# Patient Record
Sex: FEMALE | Race: WHITE | Employment: OTHER | URBAN - METROPOLITAN AREA
[De-identification: names, ages, dates, MRNs, and addresses within clinical notes are randomized per-mention and may not be internally consistent; named-entity substitution may affect disease eponyms.]

---

## 2018-08-06 ENCOUNTER — HOSPITAL ENCOUNTER (EMERGENCY)
Age: 78
Discharge: HOME | End: 2018-08-06
Payer: MEDICARE

## 2018-08-06 VITALS
HEART RATE: 72 BPM | SYSTOLIC BLOOD PRESSURE: 123 MMHG | RESPIRATION RATE: 18 BRPM | OXYGEN SATURATION: 97 % | DIASTOLIC BLOOD PRESSURE: 76 MMHG | TEMPERATURE: 98.1 F | HEIGHT: 65 IN | WEIGHT: 180 LBS | BODY MASS INDEX: 29.99 KG/M2

## 2018-08-06 DIAGNOSIS — R21 RASH: Primary | ICD-10-CM

## 2018-08-06 PROCEDURE — 99212 OFFICE O/P EST SF 10 MIN: CPT

## 2018-08-06 RX ORDER — PREDNISONE 10 MG/1
TABLET ORAL
Qty: 18 TABLET | Refills: 0 | Status: SHIPPED | OUTPATIENT
Start: 2018-08-06 | End: 2020-11-11

## 2018-08-06 RX ORDER — CETIRIZINE HYDROCHLORIDE 5 MG/1
5 TABLET ORAL DAILY PRN
Qty: 12 TABLET | Refills: 0 | Status: SHIPPED | OUTPATIENT
Start: 2018-08-06

## 2018-08-06 NOTE — ED PROVIDER NOTES
Nursing note and vitals reviewed. Procedures    MDM  Number of Diagnoses or Management Options  Rash:   Diagnosis management comments: I will treat this as a insect bite type of inflammatory reaction O Pl. her on steroids and antihistamines have her follow-up with her doctor come back if worse.         --------------------------------------------- PAST HISTORY ---------------------------------------------  Past Medical History:  has a past medical history of Arthritis; CAD (coronary artery disease); [facility] ([facility] Hiatal hernia; Hyperlipidemia; Hypertension; and MI, old. Past Surgical History:  has a past surgical history that includes Coronary angioplasty with stent; Breast surgery; Hemorrhoid surgery; Tonsillectomy; Cataract removal with implant (04/02/12); and Hysterectomy. Social History:  reports that she has never smoked. She has never used smokeless tobacco. She reports that she does not drink alcohol. Family History: family history is not on file. The patients home medications have been reviewed. Allergies: Patient has no known allergies. -------------------------------------------------- RESULTS -------------------------------------------------  No results found for this visit on 08/06/18. No orders to display       ------------------------- NURSING NOTES AND VITALS REVIEWED ---------------------------   The nursing notes within the ED encounter and vital signs as below have been reviewed. /76   Pulse 72   Temp 98.1 °F (36.7 °C) (Oral)   Resp 18   Ht 5' 5\" (1.651 m)   Wt 180 lb (81.6 kg)   SpO2 97%   BMI 29.95 kg/m²   Oxygen Saturation Interpretation: Normal      ------------------------------------------ PROGRESS NOTES ------------------------------------------   I have spoken with the patient and discussed todays results, in addition to providing specific details for the plan of care and counseling regarding the diagnosis and prognosis.   Their questions are

## 2020-11-11 ENCOUNTER — APPOINTMENT (OUTPATIENT)
Dept: GENERAL RADIOLOGY | Age: 80
End: 2020-11-11
Payer: MEDICARE

## 2020-11-11 ENCOUNTER — HOSPITAL ENCOUNTER (EMERGENCY)
Age: 80
Discharge: HOME | End: 2020-11-11
Attending: EMERGENCY MEDICINE
Payer: MEDICARE

## 2020-11-11 VITALS
SYSTOLIC BLOOD PRESSURE: 116 MMHG | OXYGEN SATURATION: 96 % | BODY MASS INDEX: 28.32 KG/M2 | WEIGHT: 170 LBS | DIASTOLIC BLOOD PRESSURE: 72 MMHG | TEMPERATURE: 99.6 F | RESPIRATION RATE: 16 BRPM | HEIGHT: 65 IN | HEART RATE: 78 BPM

## 2020-11-11 LAB
ALBUMIN SERPL-MCNC: 3.7 G/DL (ref 3.5–5.2)
ALP BLD-CCNC: 81 U/L (ref 35–104)
ALT SERPL-CCNC: 17 U/L (ref 0–32)
ANION GAP SERPL CALCULATED.3IONS-SCNC: 12 MMOL/L (ref 7–16)
AST SERPL-CCNC: 28 U/L (ref 0–31)
BASOPHILS ABSOLUTE: 0.01 E9/L (ref 0–0.2)
BASOPHILS RELATIVE PERCENT: 0.3 % (ref 0–2)
BILIRUB SERPL-MCNC: 0.7 MG/DL (ref 0–1.2)
BILIRUBIN URINE: NEGATIVE
BLOOD, URINE: NEGATIVE
BUN BLDV-MCNC: 17 MG/DL (ref 8–23)
CALCIUM SERPL-MCNC: 9 MG/DL (ref 8.6–10.2)
CHLORIDE BLD-SCNC: 96 MMOL/L (ref 98–107)
CLARITY: CLEAR
CO2: 26 MMOL/L (ref 22–29)
COLOR: YELLOW
CREAT SERPL-MCNC: 1 MG/DL (ref 0.5–1)
EOSINOPHILS ABSOLUTE: 0 E9/L (ref 0.05–0.5)
EOSINOPHILS RELATIVE PERCENT: 0 % (ref 0–6)
GFR AFRICAN AMERICAN: >60
GFR NON-AFRICAN AMERICAN: 53 ML/MIN/1.73
GLUCOSE BLD-MCNC: 139 MG/DL (ref 74–99)
GLUCOSE URINE: NEGATIVE MG/DL
HCT VFR BLD CALC: 36.2 % (ref 34–48)
HEMOGLOBIN: 12.7 G/DL (ref 11.5–15.5)
IMMATURE GRANULOCYTES #: 0.01 E9/L
IMMATURE GRANULOCYTES %: 0.3 % (ref 0–5)
KETONES, URINE: NEGATIVE MG/DL
LACTIC ACID: 1.5 MMOL/L (ref 0.5–2.2)
LEUKOCYTE ESTERASE, URINE: NEGATIVE
LIPASE: 60 U/L (ref 13–60)
LYMPHOCYTES ABSOLUTE: 0.96 E9/L (ref 1.5–4)
LYMPHOCYTES RELATIVE PERCENT: 27.3 % (ref 20–42)
MCH RBC QN AUTO: 32 PG (ref 26–35)
MCHC RBC AUTO-ENTMCNC: 35.1 % (ref 32–34.5)
MCV RBC AUTO: 91.2 FL (ref 80–99.9)
MONOCYTES ABSOLUTE: 0.22 E9/L (ref 0.1–0.95)
MONOCYTES RELATIVE PERCENT: 6.3 % (ref 2–12)
NEUTROPHILS ABSOLUTE: 2.32 E9/L (ref 1.8–7.3)
NEUTROPHILS RELATIVE PERCENT: 65.8 % (ref 43–80)
NITRITE, URINE: NEGATIVE
PDW BLD-RTO: 12.3 FL (ref 11.5–15)
PH UA: 5.5 (ref 5–9)
PLATELET # BLD: 202 E9/L (ref 130–450)
PMV BLD AUTO: 9.6 FL (ref 7–12)
POTASSIUM SERPL-SCNC: 3.2 MMOL/L (ref 3.5–5)
PROTEIN UA: NEGATIVE MG/DL
RBC # BLD: 3.97 E12/L (ref 3.5–5.5)
SODIUM BLD-SCNC: 134 MMOL/L (ref 132–146)
SPECIFIC GRAVITY UA: 1.01 (ref 1–1.03)
TOTAL PROTEIN: 7.3 G/DL (ref 6.4–8.3)
UROBILINOGEN, URINE: 0.2 E.U./DL
WBC # BLD: 3.5 E9/L (ref 4.5–11.5)

## 2020-11-11 PROCEDURE — 81003 URINALYSIS AUTO W/O SCOPE: CPT

## 2020-11-11 PROCEDURE — 6370000000 HC RX 637 (ALT 250 FOR IP): Performed by: STUDENT IN AN ORGANIZED HEALTH CARE EDUCATION/TRAINING PROGRAM

## 2020-11-11 PROCEDURE — 80053 COMPREHEN METABOLIC PANEL: CPT

## 2020-11-11 PROCEDURE — 85025 COMPLETE CBC W/AUTO DIFF WBC: CPT

## 2020-11-11 PROCEDURE — 83690 ASSAY OF LIPASE: CPT

## 2020-11-11 PROCEDURE — 96374 THER/PROPH/DIAG INJ IV PUSH: CPT

## 2020-11-11 PROCEDURE — 71046 X-RAY EXAM CHEST 2 VIEWS: CPT

## 2020-11-11 PROCEDURE — 2580000003 HC RX 258: Performed by: STUDENT IN AN ORGANIZED HEALTH CARE EDUCATION/TRAINING PROGRAM

## 2020-11-11 PROCEDURE — 83605 ASSAY OF LACTIC ACID: CPT

## 2020-11-11 PROCEDURE — 6360000002 HC RX W HCPCS

## 2020-11-11 PROCEDURE — 99283 EMERGENCY DEPT VISIT LOW MDM: CPT

## 2020-11-11 PROCEDURE — 36415 COLL VENOUS BLD VENIPUNCTURE: CPT

## 2020-11-11 RX ORDER — POTASSIUM CHLORIDE 20 MEQ/1
40 TABLET, EXTENDED RELEASE ORAL ONCE
Status: COMPLETED | OUTPATIENT
Start: 2020-11-11 | End: 2020-11-11

## 2020-11-11 RX ORDER — DOXYCYCLINE HYCLATE 100 MG
100 TABLET ORAL 2 TIMES DAILY
Qty: 20 TABLET | Refills: 0 | Status: SHIPPED | OUTPATIENT
Start: 2020-11-11 | End: 2020-11-21

## 2020-11-11 RX ORDER — 0.9 % SODIUM CHLORIDE 0.9 %
1000 INTRAVENOUS SOLUTION INTRAVENOUS ONCE
Status: COMPLETED | OUTPATIENT
Start: 2020-11-11 | End: 2020-11-11

## 2020-11-11 RX ORDER — ONDANSETRON 2 MG/ML
INJECTION INTRAMUSCULAR; INTRAVENOUS
Status: COMPLETED
Start: 2020-11-11 | End: 2020-11-11

## 2020-11-11 RX ORDER — ONDANSETRON 4 MG/1
4 TABLET, ORALLY DISINTEGRATING ORAL 3 TIMES DAILY PRN
Qty: 21 TABLET | Refills: 0 | Status: SHIPPED | OUTPATIENT
Start: 2020-11-11

## 2020-11-11 RX ORDER — ONDANSETRON 2 MG/ML
4 INJECTION INTRAMUSCULAR; INTRAVENOUS ONCE
Status: COMPLETED | OUTPATIENT
Start: 2020-11-11 | End: 2020-11-11

## 2020-11-11 RX ADMIN — POTASSIUM CHLORIDE 40 MEQ: 1500 TABLET, EXTENDED RELEASE ORAL at 17:09

## 2020-11-11 RX ADMIN — ONDANSETRON 4 MG: 2 INJECTION INTRAMUSCULAR; INTRAVENOUS at 14:54

## 2020-11-11 RX ADMIN — SODIUM CHLORIDE 1000 ML: 9 INJECTION, SOLUTION INTRAVENOUS at 14:55

## 2020-11-11 ASSESSMENT — ENCOUNTER SYMPTOMS
SORE THROAT: 0
HEMATOCHEZIA: 0
COUGH: 0
RHINORRHEA: 0
VOMITING: 0
NAUSEA: 1
VISUAL CHANGE: 0
ABDOMINAL PAIN: 0
CHEST TIGHTNESS: 0
SHORTNESS OF BREATH: 0
PHOTOPHOBIA: 0
DIARRHEA: 0

## 2020-11-11 NOTE — ED NOTES
FIRST PROVIDER CONTACT ASSESSMENT NOTE      Department of Emergency Medicine   ED  First Provider Note   11/11/20  2:26 PM EST    Chief Complaint: Fatigue (tested positive for covid last wed c/o weakness at this time denies cough statesloss of appetite also)      History of Present Illness:    Minerva Kelly is a 78 y.o. female who presents to the ED by private car for anorexia, generalized weakness/fatigue, nausea. Patient's tested positive for COVID-19 1 week ago. She denies any pain. Focused Screening Exam:  Constitutional:  Alert, appears stated age and is in no distress. *ALLERGIES*     Patient has no known allergies.      ED Triage Vitals [11/11/20 1424]   BP Temp Temp Source Pulse Resp SpO2 Height Weight   128/64 99.6 °F (37.6 °C) Oral 70 16 95 % 5' 5\" (1.651 m) 170 lb (77.1 kg)      Initial Plan of Care:  Initiate Treatment-Testing, Proceed toTreatment Area When Bed Available for ED Attending/MLP to Continue Care    -----------------[address] ASSESSMENT NOTE--------------  Electronically signed by Luz Maria Briggs PA-C   DD: 11/11/20       Luz Maria Briggs PA-C  11/11/20 [id number]

## 2020-11-11 NOTE — ED PROVIDER NOTES
Patient is an 78-year-old female with a history of CAD, hypertension, hyperlipidemia that presents emergency department for evaluation of fatigue. Patient tested positive for Covid 1 week ago. Since that time she has been very fatigued, weak with a decreased appetite. Nothing seems to make it better or worse. Is been constant and moderate in severity. She has had a slight nonproductive cough throughout that time. Patient does admit to mild nausea. She denies lightheadedness, dizziness, fever, chest pain, shortness of breath, change in bowel habits, change in urinary habits. Patient was concerned as her symptoms have not been improving over the last week and she spoke with her primary care physician who told her to come in for further evaluation of her intense fatigue. The history is provided by the patient. Fatigue   Severity:  Moderate  Onset quality:  Sudden  Duration:  1 week  Timing:  Constant  Progression:  Unchanged  Chronicity:  New  Relieved by:  Nothing  Worsened by:  Nothing  Ineffective treatments:  None tried  Associated symptoms: anorexia, lethargy and nausea    Associated symptoms: no abdominal pain, no ataxia, no chest pain, no cough, no diarrhea, no difficulty walking, no dizziness, no dysuria, no numbness in extremities, no fever, no frequency, no headaches, no hematochezia, no melena, no myalgias, no near-syncope, no shortness of breath, no syncope, no urgency, no vision change and no vomiting         Review of Systems   Constitutional: Positive for activity change, appetite change and fatigue. Negative for chills, diaphoresis and fever. HENT: Negative for congestion, rhinorrhea and sore throat. Eyes: Negative for photophobia and visual disturbance. Respiratory: Negative for cough, chest tightness and shortness of breath. Cardiovascular: Negative for chest pain, syncope and near-syncope. Gastrointestinal: Positive for anorexia and nausea.  Negative for abdominal pain, diarrhea, hematochezia, melena and vomiting. Genitourinary: Negative for decreased urine volume, difficulty urinating, dysuria, frequency and urgency. Musculoskeletal: Negative for myalgias. Skin: Negative for pallor and rash. Neurological: Negative for dizziness, weakness, light-headedness and headaches. Physical Exam  Vitals signs and nursing note reviewed. Constitutional:       General: She is not in acute distress. Appearance: Normal appearance. She is obese. She is not ill-appearing or diaphoretic. HENT:      Head: Normocephalic and atraumatic. Mouth/Throat:      Mouth: Mucous membranes are moist.   Eyes:      Extraocular Movements: Extraocular movements intact. Pupils: Pupils are equal, round, and reactive to light. Cardiovascular:      Rate and Rhythm: Normal rate and regular rhythm. Pulses: Normal pulses. Heart sounds: Normal heart sounds. Pulmonary:      Effort: Pulmonary effort is normal. No respiratory distress. Breath sounds: Rhonchi (Very mild rhonchi scattered bilaterally.) present. No wheezing or rales. Chest:      Chest wall: No tenderness. Abdominal:      General: Abdomen is flat. Palpations: Abdomen is soft. Tenderness: There is no abdominal tenderness. There is no guarding or rebound. Musculoskeletal:         General: No swelling or tenderness. Lymphadenopathy:      Cervical: No cervical adenopathy. Skin:     General: Skin is warm and dry. Neurological:      Mental Status: She is alert and oriented to person, place, and time. Procedures     MDM  Number of Diagnoses or Management Options  COVID-19:   Generalized weakness:   Hypokalemia:   Pneumonia of right lower lobe due to infectious organism:   Diagnosis management comments: Patient is an 47-year-old female that presents emergency department for evaluation of 15.   Patient resting comfortably in no apparent distress on exam.  Vital signs are normal including normal oxygen saturation of 95% on room air. Blood work unremarkable including normal electrolytes. Chest x-ray did show a possible right lower lobe pneumonia. Patient feeling better after IV fluids and Zofran. Will be discharged home with a prescription for Zofran as well as a 10-day prescription for doxycycline. She is to follow-up with her primary care physician in 2 to 3 days. .  Symptoms for return to the emergency room were discussed with the patient. Discharged home in stable condition.            --------------------------------------------- PAST HISTORY ---------------------------------------------  Past Medical History:  has a past medical history of Arthritis, CAD (coronary artery disease), [facility] ([facility] Hiatal hernia, Hyperlipidemia, Hypertension, and MI, old. Past Surgical History:  has a past surgical history that includes Coronary angioplasty with stent; Breast surgery; Hemorrhoid surgery; Tonsillectomy; Cataract removal with implant (04/02/12); and Hysterectomy. Social History:  reports that she has never smoked. She has never used smokeless tobacco. She reports that she does not drink alcohol. Family History: family history is not on file. The patients home medications have been reviewed. Allergies: Patient has no known allergies.     -------------------------------------------------- RESULTS -------------------------------------------------  Labs:  Results for orders placed or performed during the hospital encounter of 11/11/20   Comprehensive Metabolic Panel   Result Value Ref Range    Sodium 134 132 - 146 mmol/L    Potassium 3.2 (L) 3.5 - 5.0 mmol/L    Chloride 96 (L) 98 - 107 mmol/L    CO2 26 22 - 29 mmol/L    Anion Gap 12 7 - 16 mmol/L    Glucose 139 (H) 74 - 99 mg/dL    BUN 17 8 - 23 mg/dL    CREATININE 1.0 0.5 - 1.0 mg/dL    GFR Non-African American 53 >=60 mL/min/1.73    GFR African American >60     Calcium 9.0 8.6 - 10.2 mg/dL    Total Protein 7.3 6.4 - 8.3 g/dL    Alb 3.7 3.5 - 5.2 g/dL    Total Bilirubin 0.7 0.0 - 1.2 mg/dL    Alkaline Phosphatase 81 35 - 104 U/L    ALT 17 0 - 32 U/L    AST 28 0 - 31 U/L   CBC Auto Differential   Result Value Ref Range    WBC 3.5 (L) 4.5 - 11.5 E9/L    RBC 3.97 3.50 - 5.50 E12/L    Hemoglobin 12.7 11.5 - 15.5 g/dL    Hematocrit 36.2 34.0 - 48.0 %    MCV 91.2 80.0 - 99.9 fL    MCH 32.0 26.0 - 35.0 pg    MCHC 35.1 (H) 32.0 - 34.5 %    RDW 12.3 11.5 - 15.0 fL    Platelets 212 346 - 493 E9/L    MPV 9.6 7.0 - 12.0 fL    Neutrophils % 65.8 43.0 - 80.0 %    Immature Granulocytes % 0.3 0.0 - 5.0 %    Lymphocytes % 27.3 20.0 - 42.0 %    Monocytes % 6.3 2.0 - 12.0 %    Eosinophils % 0.0 0.0 - 6.0 %    Basophils % 0.3 0.0 - 2.0 %    Neutrophils Absolute 2.32 1.80 - 7.30 E9/L    Immature Granulocytes # 0.01 E9/L    Lymphocytes Absolute 0.96 (L) 1.50 - 4.00 E9/L    Monocytes Absolute 0.22 0.10 - 0.95 E9/L    Eosinophils Absolute 0.00 (L) 0.05 - 0.50 E9/L    Basophils Absolute 0.01 0.00 - 0.20 E9/L   Lactic Acid, Plasma   Result Value Ref Range    Lactic Acid 1.5 0.5 - 2.2 mmol/L   Lipase   Result Value Ref Range    Lipase 60 13 - 60 U/L       Radiology:  XR CHEST (2 VW)    (Results Pending)       ------------------------- NURSING NOTES AND VITALS REVIEWED ---------------------------  Date / Time Roomed:  11/11/2020  2:30 PM  ED Bed Assignment:  16/16    The nursing notes within the ED encounter and vital signs as below have been reviewed. /64   Pulse 70   Temp 99.6 °F (37.6 °C) (Oral)   Resp 16   Ht 5' 5\" (1.651 m)   Wt 170 lb (77.1 kg)   SpO2 95%   BMI 28.29 kg/m²   Oxygen Saturation Interpretation: Normal      ------------------------------------------ PROGRESS NOTES ------------------------------------------  4:01 PM EST  I have spoken with the patient and discussed todays results, in addition to providing specific details for the plan of care and counseling regarding the diagnosis and prognosis.   Their questions are answered at this time and they (tested positive for covid last wed c/o weakness at this time denies cough statesloss of appetite also)       My findings/plan: The primary encounter diagnosis was Generalized weakness. Diagnoses of Hypokalemia, COVID-19, and Pneumonia of right lower lobe due to infectious organism were also pertinent to this visit.   Discharge Medication List as of 11/11/2020  5:06 PM      START taking these medications    Details   doxycycline hyclate (VIBRA-TABS) 100 MG tablet Take 1 tablet by mouth 2 times daily for 10 days, Disp-20 tablet,R-0Print      ondansetron (ZOFRAN-ODT) 4 MG disintegrating tablet Take 1 tablet by mouth 3 times daily as needed for Nausea or Vomiting, Disp-21 tablet,R-0Print           Serena Rosario, DO       Franck Cheney., DO  Resident  11/11/20 [id number] Heena Simon,   11/11/20 2044

## 2020-11-12 ENCOUNTER — CARE COORDINATION (OUTPATIENT)
Dept: CARE COORDINATION | Age: 80
End: 2020-11-12

## 2020-11-12 NOTE — CARE COORDINATION
Patient contacted regarding [facility]. Discussed COVID-19 related testing which was not done at this time. Test results were not done. Patient informed of results, if available? NA    Patient tested positive from outside facility one week ago. Care Transition Nurse/ Ambulatory Care Manager contacted the patient by telephone to perform post discharge assessment. Call within 2 business days of discharge: Yes. Verified name and  with patient as identifiers. Provided introduction to self, and explanation of the CTN/ACM role, and reason for call due to risk factors for infection and/or exposure to COVID-19. Symptoms reviewed with patient who verbalized the following symptoms: fatigue, appetite change, nausea. Due to no new or worsening symptoms encounter was not routed to provider for escalation. Discussed follow-up appointments. If no appointment was previously scheduled, appointment scheduling offered: [facility] follow up appointment(s): No future appointments. Non-[facility] follow up appointment(s):     Patient to contact Dr. Robert Fung office if symptoms to worsen. Has already been in contact with office since testing positive. Patient states she feels okay, has a headache. Advised patient to hydrate with pedialyte or gatorade. She has filled medications and is taking as written. No questions. Non-face-to-face services provided:  Education of patient/family/caregiver/guardian to support self-management-medication education, symptom management. Advance Care Planning:   Does patient have an Advance Directive:  not on file. Patient has following risk factors of: no known risk factors. CTN/ACM reviewed discharge instructions, medical action plan and red flags such as increased shortness of breath, increasing fever and signs of decompensation with patient who verbalized understanding.    Discussed exposure protocols and quarantine with CDC Guidelines What to do if you are sick with coronavirus disease 2019.  Patient was given an opportunity for questions and concerns. The patient agrees to contact the [facility] exposure line [phone], local [facility] department [place] [facility]: ([phone]) and PCP office for questions related to their healthcare. CTN/ACM provided contact information for future needs. Reviewed and educated patient on any new and changed medications related to discharge diagnosis     Patient/family/caregiver given information for [facility] and agrees to enroll no  Patient's preferred e-mail: NA  Patient's preferred phone number: NA  Based on Loop alert triggers, patient will be contacted by nurse care manager for worsening symptoms. Plan for follow-up call in 5-7 days based on severity of symptoms and risk factors.

## 2020-11-20 ENCOUNTER — CARE COORDINATION (OUTPATIENT)
Dept: CARE COORDINATION | Age: 80
End: 2020-11-20

## 2020-11-20 NOTE — CARE COORDINATION
Date/Time:  11/20/2020 3:36 PM  Attempted to reach patient by telephone. Left HIPPA compliant message requesting a return call. Will attempt to reach patient again.

## 2020-11-25 ENCOUNTER — CARE COORDINATION (OUTPATIENT)
Dept: CARE COORDINATION | Age: 80
End: 2020-11-25

## 2024-09-25 DIAGNOSIS — M25.561 RIGHT KNEE PAIN, UNSPECIFIED CHRONICITY: ICD-10-CM

## 2024-09-25 DIAGNOSIS — M25.562 LEFT KNEE PAIN, UNSPECIFIED CHRONICITY: Primary | ICD-10-CM

## 2024-10-01 ENCOUNTER — OFFICE VISIT (OUTPATIENT)
Dept: ORTHOPEDIC SURGERY | Age: 84
End: 2024-10-01
Payer: MEDICARE

## 2024-10-01 VITALS — HEIGHT: 65 IN | WEIGHT: 165 LBS | BODY MASS INDEX: 27.49 KG/M2

## 2024-10-01 DIAGNOSIS — M17.11 PRIMARY OSTEOARTHRITIS OF RIGHT KNEE: Primary | ICD-10-CM

## 2024-10-01 DIAGNOSIS — M17.12 PRIMARY OSTEOARTHRITIS OF LEFT KNEE: ICD-10-CM

## 2024-10-01 PROCEDURE — 4004F PT TOBACCO SCREEN RCVD TLK: CPT | Performed by: ORTHOPAEDIC SURGERY

## 2024-10-01 PROCEDURE — G8427 DOCREV CUR MEDS BY ELIG CLIN: HCPCS | Performed by: ORTHOPAEDIC SURGERY

## 2024-10-01 PROCEDURE — 1090F PRES/ABSN URINE INCON ASSESS: CPT | Performed by: ORTHOPAEDIC SURGERY

## 2024-10-01 PROCEDURE — 1123F ACP DISCUSS/DSCN MKR DOCD: CPT | Performed by: ORTHOPAEDIC SURGERY

## 2024-10-01 PROCEDURE — G8419 CALC BMI OUT NRM PARAM NOF/U: HCPCS | Performed by: ORTHOPAEDIC SURGERY

## 2024-10-01 PROCEDURE — G8484 FLU IMMUNIZE NO ADMIN: HCPCS | Performed by: ORTHOPAEDIC SURGERY

## 2024-10-01 PROCEDURE — G8400 PT W/DXA NO RESULTS DOC: HCPCS | Performed by: ORTHOPAEDIC SURGERY

## 2024-10-01 PROCEDURE — 99203 OFFICE O/P NEW LOW 30 MIN: CPT | Performed by: ORTHOPAEDIC SURGERY

## 2024-10-01 RX ORDER — SITAGLIPTIN 50 MG/1
TABLET, FILM COATED ORAL
COMMUNITY
Start: 2024-04-01

## 2024-10-01 RX ORDER — POTASSIUM CHLORIDE 750 MG/1
TABLET, EXTENDED RELEASE ORAL
COMMUNITY
Start: 2023-11-09

## 2024-10-01 RX ORDER — AMLODIPINE BESYLATE 10 MG/1
TABLET ORAL
COMMUNITY
Start: 2023-11-09

## 2024-10-01 RX ORDER — KETOCONAZOLE 20 MG/G
CREAM TOPICAL
COMMUNITY
Start: 2024-04-01

## 2024-10-01 RX ORDER — EZETIMIBE 10 MG/1
TABLET ORAL
COMMUNITY
Start: 2023-11-09

## 2024-10-01 RX ORDER — LOSARTAN POTASSIUM AND HYDROCHLOROTHIAZIDE 25; 100 MG/1; MG/1
TABLET ORAL
COMMUNITY
Start: 2024-04-01

## 2024-10-01 RX ORDER — ATORVASTATIN CALCIUM 80 MG/1
80 TABLET, FILM COATED ORAL DAILY
COMMUNITY

## 2024-10-01 NOTE — PROGRESS NOTES
Chief Complaint   Patient presents with    Knee Pain     Bilateral Knee, L>R, x years, had a fall 2 weeks ago.  Previous treatment years ago, not much relief.  No previous knee surgery.         Subjective:     Patient ID: Juancarlos Bob is a 83 y.o..  female    Knee Pain  Patient complains of bilateral knee pain r>L. This is evaluated as a personal injury. There was a history of injury.  The patient fell 1 month ago.  The pain began several years ago. The pain is located medial. She describes  Her symptoms as aching, sharp, shooting, and stabbing. She has experienced popping, clicking, locking, and giving way in the affected knee.  The patient has had pain with kneeling, squating, and climbing stairs.  Symptoms improve with rest, heat, ice, medication: Tylenol used but not effective. The symptoms are worse with activity. The knee has given out or felt unstable. The patient cannot bend and straighten the knee fully.  The patient is active in none. Treatment to date has been ice, heat, Tylenol, without significant relief. The patient is not working. The patients occupation is retired.     Past Medical History:   Diagnosis Date    Arthritis     CAD (coronary artery disease)     Cancer (HCC)     breast rt. 10 years agp    Hiatal hernia     Hyperlipidemia     Hypertension     MI, old     10 years ago     Past Surgical History:   Procedure Laterality Date    BREAST SURGERY      rt. lumpectomy/lymph nodes    CATARACT EXTRACTION W/  INTRAOCULAR LENS IMPLANT  04/02/12    left eye    CORONARY ANGIOPLASTY WITH STENT PLACEMENT      1 stent    HEMORRHOID SURGERY      HYSTERECTOMY      TONSILLECTOMY         Current Outpatient Medications:     losartan-hydroCHLOROthiazide (HYZAAR) 100-25 MG per tablet, , Disp: , Rfl:     amLODIPine (NORVASC) 10 MG tablet, , Disp: , Rfl:     ezetimibe (ZETIA) 10 MG tablet, , Disp: , Rfl:     ketoconazole (NIZORAL) 2 % cream, , Disp: , Rfl:     potassium chloride (KLOR-CON) 10 MEQ extended